# Patient Record
Sex: MALE | Race: WHITE | Employment: FULL TIME | URBAN - NONMETROPOLITAN AREA
[De-identification: names, ages, dates, MRNs, and addresses within clinical notes are randomized per-mention and may not be internally consistent; named-entity substitution may affect disease eponyms.]

---

## 2018-10-19 ENCOUNTER — OFFICE VISIT (OUTPATIENT)
Dept: ORTHOPEDIC SURGERY | Age: 53
End: 2018-10-19
Payer: COMMERCIAL

## 2018-10-19 VITALS
BODY MASS INDEX: 35.61 KG/M2 | DIASTOLIC BLOOD PRESSURE: 114 MMHG | HEIGHT: 68 IN | WEIGHT: 235 LBS | HEART RATE: 85 BPM | SYSTOLIC BLOOD PRESSURE: 188 MMHG

## 2018-10-19 DIAGNOSIS — M17.12 PRIMARY OSTEOARTHRITIS OF LEFT KNEE: ICD-10-CM

## 2018-10-19 DIAGNOSIS — M10.9 ACUTE GOUTY ARTHRITIS: ICD-10-CM

## 2018-10-19 DIAGNOSIS — M23.204 DEGENERATIVE TEAR OF MEDIAL MENISCUS OF LEFT KNEE: Primary | ICD-10-CM

## 2018-10-19 PROCEDURE — 20610 DRAIN/INJ JOINT/BURSA W/O US: CPT | Performed by: ORTHOPAEDIC SURGERY

## 2018-10-19 PROCEDURE — 99203 OFFICE O/P NEW LOW 30 MIN: CPT | Performed by: ORTHOPAEDIC SURGERY

## 2018-10-19 RX ORDER — ALLOPURINOL 300 MG/1
300 TABLET ORAL DAILY
COMMUNITY

## 2018-10-19 RX ORDER — IBUPROFEN 200 MG
200 TABLET ORAL EVERY 6 HOURS PRN
COMMUNITY

## 2018-10-19 RX ORDER — INDOMETHACIN 50 MG/1
50 CAPSULE ORAL 3 TIMES DAILY
Qty: 60 CAPSULE | Refills: 2 | Status: SHIPPED | OUTPATIENT
Start: 2018-10-19

## 2018-10-19 NOTE — PROGRESS NOTES
Site: Left knee    Depo-Medrol  NDC#: 5551-0025-57  Lot number: [id number]    Depo-Medrol  NDC#: 5261-6693-20  Lot#: [id number]    Bupivacaine  NDC#; 91305-016-97  Lot number: [id number]    Product: Depo/Bupivacaine    All medications were supplied by the facility

## 2019-04-10 ENCOUNTER — TELEPHONE (OUTPATIENT)
Dept: ORTHOPEDIC SURGERY | Age: 54
End: 2019-04-10

## 2019-04-25 ENCOUNTER — OFFICE VISIT (OUTPATIENT)
Dept: ORTHOPEDIC SURGERY | Age: 54
End: 2019-04-25
Payer: COMMERCIAL

## 2019-04-25 VITALS
BODY MASS INDEX: 37.89 KG/M2 | HEART RATE: 101 BPM | SYSTOLIC BLOOD PRESSURE: 148 MMHG | DIASTOLIC BLOOD PRESSURE: 93 MMHG | HEIGHT: 68 IN | WEIGHT: 250 LBS

## 2019-04-25 DIAGNOSIS — M25.561 PAIN IN BOTH KNEES, UNSPECIFIED CHRONICITY: Primary | ICD-10-CM

## 2019-04-25 DIAGNOSIS — M25.562 PAIN IN BOTH KNEES, UNSPECIFIED CHRONICITY: Primary | ICD-10-CM

## 2019-04-25 PROCEDURE — 99202 OFFICE O/P NEW SF 15 MIN: CPT | Performed by: PHYSICIAN ASSISTANT

## 2019-04-25 RX ORDER — NAPROXEN 500 MG/1
500 TABLET ORAL
Refills: 5 | COMMUNITY
Start: 2019-04-05

## 2019-04-25 ASSESSMENT — ENCOUNTER SYMPTOMS
EYES NEGATIVE: 1
ALLERGIC/IMMUNOLOGIC NEGATIVE: 1
GASTROINTESTINAL NEGATIVE: 1
RESPIRATORY NEGATIVE: 1

## 2019-04-25 NOTE — PROGRESS NOTES
Review of Systems   Constitutional: Negative. HENT: Negative. Eyes: Negative. Respiratory: Negative. Cardiovascular:        High blood pressure   Gastrointestinal: Negative. Endocrine: Negative. Genitourinary: Negative. Musculoskeletal:        Joint pain, gout   Skin: Negative. Allergic/Immunologic: Negative. Neurological: Negative. Hematological: Negative. Psychiatric/Behavioral: Negative.

## 2019-04-25 NOTE — PROGRESS NOTES
[facility]  History and Physical  Knee Pain    Date:  2019    Name:  Therese Pascual  Address:  [address] [place] [address]    :  1965      Age:   46 y.o.    SSN:  [id number]      Medical Record Number:  [id number]    Reason for Visit:    New Patient (bilateral knees )      HPI:   Therese Pascual is a 46y.o. year old male who presents to our office today complaining of  bilateral knee pain. Patient's injuries were both at work however he is not utilizing reclaiming Worker's Compensation. Patient states that approximately 2 years ago he was at work cutting down trees when a larger branch fell on the medial aspect of his left knee. He was seen by an orthopedic surgeon Dr. Oksana Neville in [place] evaluate the patient at that time. Physical course the past 2 years the patient has observed some activity modification as well as corticosteroid injections in the left knee but now do not provide any relief. Approximately 3 weeks ago the patient was at work going up a step carrying some heavy equipment when he felt a pop in his right knee on the medial joint line. He states he collapsed to the ground had difficult time ambulating after that incident. Since then he has been experiencing worse pain in his right and left knee as he is unable to compensate on either one of them. Patient rates this pain a 9-10, sharp, and worse with prolonged walking. The patient does have a long-standing history of low back pain and states that he finds it difficult to sleep at night as the pain in his knees and low back wake him up at night. He does note a burning sensation down the lateral aspect of his left leg that radiates just to the knee. He denies any weakness. He does feel some sensations of instability in the left with intermittent catching and clicking.     Pain Assessment  Location of Pain: Knee  Location Modifiers: Right, Left  Severity of Pain: 9  Quality of Pain: Sharp  Duration of Pain: A few days  Aggravating Factors: Walking, Other (Comment)(lying down)  Relieving Factors: Other (Comment)(nothing)  Result of Injury: Yes  Work-Related Injury: No  Are there other pain locations you wish to document?: No    Review of Systems:  A 14 point review of systems was completed by the patient on 4/25/2019 and is available in the media section of the scanned medical record and was reviewed on 4/25/2019. The review is negative with the exception of those things mentioned in the HPI and Past Medical History. Past History:  Past Medical History:   Diagnosis Date    HTN (hypertension)      Past Surgical History:   Procedure Laterality Date    CYST REMOVAL       Current Outpatient Medications on File Prior to Visit   Medication Sig Dispense Refill    naproxen (NAPROSYN) 500 MG tablet 500 mg  5    ibuprofen (ADVIL;MOTRIN) 200 MG tablet Take 200 mg by mouth every 6 hours as needed for Pain      allopurinol (ZYLOPRIM) 300 MG tablet Take 300 mg by mouth daily      indomethacin (INDOCIN) 50 MG capsule Take 1 capsule by mouth 3 times daily 60 capsule 2     No current facility-administered medications on file prior to visit.       Social History     Socioeconomic History    Marital status: Single     Spouse name: Not on file    Number of children: Not on file    Years of education: Not on file    Highest education level: Not on file   Occupational History    Not on file   Social Needs    Financial resource strain: Not on file    Food insecurity:     Worry: Not on file     Inability: Not on file    Transportation needs:     Medical: Not on file     Non-medical: Not on file   Tobacco Use    Smoking status: Never Smoker    Smokeless tobacco: Current User   Substance and Sexual Activity    Alcohol use: Not on file    Drug use: Not on file    Sexual activity: Not on file   Lifestyle    Physical activity:     Days per week: Not on file     Minutes per session: Not on file    Stress: Not on file   Relationships    Social connections:     Talks on phone: Not on file     Gets together: Not on file     Attends Rastafarian service: Not on file     Active member of club or organization: Not on file     Attends meetings of clubs or organizations: Not on file     Relationship status: Not on file    Intimate partner violence:     Fear of current or ex partner: Not on file     Emotionally abused: Not on file     Physically abused: Not on file     Forced sexual activity: Not on file   Other Topics Concern    Not on file   Social History Narrative    Not on file     No family history on file. Current Medications:    Current Outpatient Medications   Medication Sig Dispense Refill    naproxen (NAPROSYN) 500 MG tablet 500 mg  5    ibuprofen (ADVIL;MOTRIN) 200 MG tablet Take 200 mg by mouth every 6 hours as needed for Pain      allopurinol (ZYLOPRIM) 300 MG tablet Take 300 mg by mouth daily      indomethacin (INDOCIN) 50 MG capsule Take 1 capsule by mouth 3 times daily 60 capsule 2     No current facility-administered medications for this visit. Allergies:  No Known Allergies    Physical Exam:  Vitals:    04/25/19 1015   BP: (!) 148/93   Pulse: 101     General: Ronan Bolanos is a healthy and well appearing 46y.o. year old male who is sitting comfortably in our office in no acute distress. Neuro: alert. oriented  Eyes: Extra-ocular muscles intact  Mouth: Oral mucosa moist. No perioral lesions  Pulm: Respirations unlabored and regular.   Heart: Regular rate and rhythm   Skin: warm, well perfused    Knee Examination:        RIGHT     LEFT  General:   EFFUSION:     [] NL(4) [x] Mild(3) [] Mod(2) [] Sev(1)   [] NL(4) [x] Mild(3) [] Mod(2) [] Sev(1)    TOTAL FLEX:  [x] NL(4) [] Mild(3) [] Mod(2) [] Sev(1)  120 degrees  [] NL(4) [x] Mild(3) [] Mod(2) [] Sev(1)  110 degrees   LACK of EXT:  [x] NL(4) [] Mild(3) [] Mod(2) [] Sev(1)   [x] NL(4) [] Mild(3) [] Mod(2) [] Sev(1)    QUAD WEAK: [] NL(4) [x] Mild(3) [] Mod(2) [] Sev(1)   [x] NL(4) [] Mild(3) [] Mod(2) [] Sev(1)    Points (16)  R:       L:          Tibio Femoral:       RIGHT     LEFT  JT LINE PAIN:     [] NL(4) [] Mild(3) [x] Mod(2) [] Sev(1) medial jt line  [] NL(4) [] Mild(3) [x] Mod(2) [] Sev(1)  Medial joint line   CREPITUS:        [x] NL(4) [] Mild(3) [] Mod(2) [] Sev(1)   [x] NL(4) [] Mild(3) [] Mod(2) [] Sev(1)    COMP PAIN:       [x] NL(4) [] Mild(3) [] Mod(2) [] Sev(1)   [x] NL(4) [] Mild(3) [] Mod(2) [] Sev(1)    Points (12)  R:      L:      Patello Femoral Joint:        RIGHT     LEFT  CREPITUS:                 [] NL(4) [x] Mild(3) [] Mod(2) [] Sev(1)   [] NL(4) [x] Mild(3) [] Mod(2) [] Sev(1)    COMP PAIN:               [] NL(4) [x] Mild(3) [] Mod(2) [] Sev(1)   [] NL(4) [x] Mild(3) [] Mod(2) [] Sev(1)    SOFT TISSUE PAIN:  [x] NL(4) []Mild(3) []Mod(2) [] Sev(1)  Location:    [x] NL(4) [] Mild(3) [] Mod(2) [] Sev(1)   Location:    SOFT TISSUE SWELLING:                                      [x]NL(4) []Mild(3) []Mod(2) [] Sev(1)   Location:     [x] NL(4) [] Mild(3) [] Mod(2) [] Sev(1)  Location:     Points: (16)  R:      L:         Patello Femoral Alignment:       RIGHT     LEFT  LAT. SUBLUX at 20 Degrees (%width)                                            [x]0-25 (4) []26-50 (3) []51-75 (2) [] >75 (1)    [x]0-25 (4) []26-50 (3) []51-75 (2) [] >75 (1)   MED.  SUBLUX at 20 Degrees (mm)                                            [x]15 (4) []11-15 (3) []6-10 (2) [] 0-5 (1)    [x]15 (4) []11-15 (3) []6-10 (2) [] 0-5 (1)   Q Angle at 5 Degrees                                            [x]0-15 (4) []16-20 (3) []21-25 (2) [] >25 (1)   [x]0-15 (4) []16-20 (3) []21-25 (2) [] >25 (1)   Q Angle Max at 20 Degrees                                            [x]25 (4) []30 (3) []35 (2) [] >35 (1)   [x]25 (4) []30 (3) []35 (2) [] >35 (1)   Points: (16)  R:      L:    Ligament Test: patient experiences some mild pain and tenderness with valgus and varus stress testing of the left knee. Patient also exhibits a positive Justus's to the left knee with pain along the medial joint line  Test   Right Left  Difference Test  Right Left Difference   Ant 25 Degrees [x] Normal       mm      mm      mm Med 0 Degrees [] Normal       mm      mm      mm   Ant 90 Degrees [x] Normal       mm      mm      mm Med 25 Degrees [x] Normal       mm      mm      mm   P.S. (0-3) [x] Normal         Lat 0 Degrees [] Normal       mm      mm      mm   Post 25 Degrees [x] Normal       mm      mm       mm Lat 25 Degrees [x] Normal       mm      mm      mm   Post 90 Degrees [x] Normal       mm      mm      mm ER 25 Degrees [x] Normal       deg.      deg.      deg.    RPS (0-3) [x] Normal     ER 90 Degrees [x] Normal       deg.       deg.      deg. X-ray:  Right                                                           Left  Mid Tibiofemoral:                                                          [x] NL(4) []Mild(3) [] Mod (2) [] Sev (1)     [x] NL(4) []Mild(3) [] Mod (2) [] Sev (1)    Lat Tibiofemoral:                                                         [x] NL(4) [] Mild(3) [] Mod(2) [] Sev(1)     [x] NL(4) [] Mild(3) [] Mod(2) [] Sev(1)    Patellofemoral:                               [x] NL(4) [] Mild(3) [] Mod(2) [] Sev(1)  [x] NL(4) [] Mild(3) [] Mod(2) [] Sev(1)    Alignment:                                      [x] Normal          Degrees                WBL  [x] Normal                 Degrees                WBL   Point: (12)  R:      L:            Laboratory:  No visits with results within 14 Day(s) from this visit. Latest known visit with results is:   No results found for any previous visit. No results found for this or any previous visit (from the past 24 hour(s)).     Radiographic:  2 xray views of the  bilateral knee including tunnel and merchant and one lateral view of the both knee taken in our office today reveal no fractures, dislocations, visible tumors, or signs of acute trauma. Mild arthritic changes and joint space narrowing to the lateral joint line of the right and left knee. There is a moderate amount of joint space narrowing and arthritis noted to bilateral medial compartments. No radiopaque foreign bodies. 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 X-ray imaging was also reviewed with this patient. Given the patient's history and physical exam is likely that he is exhibiting symptoms from traumatic arthritis given his weight, the physical requirements at his occupation, in the injury sustained at the past few years while working. The patient does have mild to moderate arthritic changes noted throughout the joint spacing in both the left and right knee on x-ray imaging. However given the intensity of the patient's pain as well as mechanical symptoms within the left knee and believe there is more pathology resulting from soft tissue damage after this we recently would like to order an MRI of the left knee to evaluate soft tissue structures. Moreover, the treatment of the patient's condition was discussed with him. Surgical versus conservative management of his symptoms was also discussed. He was taken through the conservative course for our arthritic patients which would include diet, low impact exercise, NSAIDs as needed, activity modification, corticosteroid and Synvisc injections. Patient has tried cortisone injections in the past with little relief.   Patient was also informed that he will most likely need a partial versus full knee replacement in the future given the decreased joint spacing in the nature of his occupation with a tight demands on his body. Until then we can attempt Visco supplementation injections or depending on what is found on the MRI and arthroscopy may help relieve some injury to the meniscus and other soft tissue structures. Patient was understanding of all instructions and agreeable to this plan. Treatment Plan:    1. MRI left knee with T2 generation  2. Activity modification  3. Diet and low impact light weight exercises  4. Ice, rest, and NSAIDs as needed  5. Follow-up in 1 week to discussed MRI results  6. Follow-up with Dr. Conchis Liz        4/25/19  3:26 PM        [id number] Sainz PA-C  Physician Assistant, Orthopaedic Surgery      This dictation was performed with a verbal recognition program [facility]) and it was checked for errors. It is possible that there are still dictated errors within this office note. If so, please bring any errors to my attention for an addendum. All efforts were made to ensure that this office note is accurate.

## 2019-04-30 DIAGNOSIS — S83.242A TEAR OF MEDIAL MENISCUS OF LEFT KNEE, UNSPECIFIED TEAR TYPE, UNSPECIFIED WHETHER OLD OR CURRENT TEAR, INITIAL ENCOUNTER: Primary | ICD-10-CM
